# Patient Record
Sex: FEMALE | Race: ASIAN | NOT HISPANIC OR LATINO
[De-identification: names, ages, dates, MRNs, and addresses within clinical notes are randomized per-mention and may not be internally consistent; named-entity substitution may affect disease eponyms.]

---

## 2017-03-06 ENCOUNTER — APPOINTMENT (OUTPATIENT)
Dept: MAMMOGRAPHY | Facility: CLINIC | Age: 47
End: 2017-03-06

## 2017-03-06 ENCOUNTER — OUTPATIENT (OUTPATIENT)
Dept: OUTPATIENT SERVICES | Facility: HOSPITAL | Age: 47
LOS: 1 days | End: 2017-03-06
Payer: COMMERCIAL

## 2017-03-06 ENCOUNTER — APPOINTMENT (OUTPATIENT)
Dept: ULTRASOUND IMAGING | Facility: CLINIC | Age: 47
End: 2017-03-06

## 2017-03-06 DIAGNOSIS — Z00.8 ENCOUNTER FOR OTHER GENERAL EXAMINATION: ICD-10-CM

## 2017-04-13 PROCEDURE — 76641 ULTRASOUND BREAST COMPLETE: CPT

## 2017-04-13 PROCEDURE — 77063 BREAST TOMOSYNTHESIS BI: CPT

## 2017-04-13 PROCEDURE — 77067 SCR MAMMO BI INCL CAD: CPT

## 2018-04-13 ENCOUNTER — RESULT REVIEW (OUTPATIENT)
Age: 48
End: 2018-04-13

## 2018-04-30 ENCOUNTER — OUTPATIENT (OUTPATIENT)
Dept: OUTPATIENT SERVICES | Facility: HOSPITAL | Age: 48
LOS: 1 days | End: 2018-04-30
Payer: COMMERCIAL

## 2018-04-30 ENCOUNTER — APPOINTMENT (OUTPATIENT)
Dept: MAMMOGRAPHY | Facility: CLINIC | Age: 48
End: 2018-04-30
Payer: COMMERCIAL

## 2018-04-30 ENCOUNTER — APPOINTMENT (OUTPATIENT)
Dept: ULTRASOUND IMAGING | Facility: CLINIC | Age: 48
End: 2018-04-30
Payer: COMMERCIAL

## 2018-04-30 DIAGNOSIS — Z12.31 ENCOUNTER FOR SCREENING MAMMOGRAM FOR MALIGNANT NEOPLASM OF BREAST: ICD-10-CM

## 2018-04-30 PROCEDURE — 77063 BREAST TOMOSYNTHESIS BI: CPT | Mod: 26

## 2018-04-30 PROCEDURE — 77067 SCR MAMMO BI INCL CAD: CPT | Mod: 26

## 2018-04-30 PROCEDURE — 76641 ULTRASOUND BREAST COMPLETE: CPT | Mod: 26,50

## 2018-04-30 PROCEDURE — 76641 ULTRASOUND BREAST COMPLETE: CPT

## 2018-04-30 PROCEDURE — 77063 BREAST TOMOSYNTHESIS BI: CPT

## 2018-04-30 PROCEDURE — 77067 SCR MAMMO BI INCL CAD: CPT

## 2019-04-16 ENCOUNTER — RESULT REVIEW (OUTPATIENT)
Age: 49
End: 2019-04-16

## 2019-06-27 ENCOUNTER — APPOINTMENT (OUTPATIENT)
Dept: MAMMOGRAPHY | Facility: CLINIC | Age: 49
End: 2019-06-27
Payer: COMMERCIAL

## 2019-06-27 ENCOUNTER — APPOINTMENT (OUTPATIENT)
Dept: ULTRASOUND IMAGING | Facility: CLINIC | Age: 49
End: 2019-06-27
Payer: COMMERCIAL

## 2019-06-27 ENCOUNTER — OUTPATIENT (OUTPATIENT)
Dept: OUTPATIENT SERVICES | Facility: HOSPITAL | Age: 49
LOS: 1 days | End: 2019-06-27
Payer: COMMERCIAL

## 2019-06-27 DIAGNOSIS — R92.8 OTHER ABNORMAL AND INCONCLUSIVE FINDINGS ON DIAGNOSTIC IMAGING OF BREAST: ICD-10-CM

## 2019-06-27 PROCEDURE — 77067 SCR MAMMO BI INCL CAD: CPT | Mod: 26

## 2019-06-27 PROCEDURE — 77067 SCR MAMMO BI INCL CAD: CPT

## 2019-06-27 PROCEDURE — 76641 ULTRASOUND BREAST COMPLETE: CPT | Mod: 26,50

## 2019-06-27 PROCEDURE — 77063 BREAST TOMOSYNTHESIS BI: CPT

## 2019-06-27 PROCEDURE — 76641 ULTRASOUND BREAST COMPLETE: CPT

## 2019-06-27 PROCEDURE — 77063 BREAST TOMOSYNTHESIS BI: CPT | Mod: 26

## 2020-07-13 ENCOUNTER — RESULT REVIEW (OUTPATIENT)
Age: 50
End: 2020-07-13

## 2020-07-15 ENCOUNTER — APPOINTMENT (OUTPATIENT)
Dept: ULTRASOUND IMAGING | Facility: CLINIC | Age: 50
End: 2020-07-15
Payer: COMMERCIAL

## 2020-07-15 ENCOUNTER — APPOINTMENT (OUTPATIENT)
Dept: MAMMOGRAPHY | Facility: CLINIC | Age: 50
End: 2020-07-15
Payer: COMMERCIAL

## 2020-07-15 ENCOUNTER — OUTPATIENT (OUTPATIENT)
Dept: OUTPATIENT SERVICES | Facility: HOSPITAL | Age: 50
LOS: 1 days | End: 2020-07-15
Payer: COMMERCIAL

## 2020-07-15 DIAGNOSIS — Z00.8 ENCOUNTER FOR OTHER GENERAL EXAMINATION: ICD-10-CM

## 2020-07-15 DIAGNOSIS — R92.8 OTHER ABNORMAL AND INCONCLUSIVE FINDINGS ON DIAGNOSTIC IMAGING OF BREAST: ICD-10-CM

## 2020-07-15 PROCEDURE — 77063 BREAST TOMOSYNTHESIS BI: CPT

## 2020-07-15 PROCEDURE — 76641 ULTRASOUND BREAST COMPLETE: CPT | Mod: 26,50

## 2020-07-15 PROCEDURE — 77067 SCR MAMMO BI INCL CAD: CPT

## 2020-07-15 PROCEDURE — 77063 BREAST TOMOSYNTHESIS BI: CPT | Mod: 26

## 2020-07-15 PROCEDURE — 76641 ULTRASOUND BREAST COMPLETE: CPT

## 2020-07-15 PROCEDURE — 77067 SCR MAMMO BI INCL CAD: CPT | Mod: 26

## 2021-01-25 ENCOUNTER — RESULT REVIEW (OUTPATIENT)
Age: 51
End: 2021-01-25

## 2021-02-10 ENCOUNTER — EMERGENCY (EMERGENCY)
Facility: HOSPITAL | Age: 51
LOS: 1 days | Discharge: HOME | End: 2021-02-10
Payer: COMMERCIAL

## 2021-02-10 VITALS
SYSTOLIC BLOOD PRESSURE: 129 MMHG | DIASTOLIC BLOOD PRESSURE: 84 MMHG | HEIGHT: 59 IN | WEIGHT: 108.03 LBS | HEART RATE: 88 BPM | RESPIRATION RATE: 20 BRPM | OXYGEN SATURATION: 98 % | TEMPERATURE: 98 F

## 2021-02-10 LAB — SARS-COV-2 RNA SPEC QL NAA+PROBE: SIGNIFICANT CHANGE UP

## 2021-02-10 PROCEDURE — U0005: CPT

## 2021-02-10 PROCEDURE — 99282 EMERGENCY DEPT VISIT SF MDM: CPT

## 2021-02-10 PROCEDURE — U0003: CPT

## 2021-02-10 PROCEDURE — 99283 EMERGENCY DEPT VISIT LOW MDM: CPT

## 2021-02-10 NOTE — ED PROVIDER NOTE - PATIENT PORTAL LINK FT
You can access the FollowMyHealth Patient Portal offered by [facility] by registering at the following website: [email or web address]. By joining [facility]’s FollowMyHealth portal, you will also be able to view your health information using other applications (apps) compatible with our system.

## 2021-02-10 NOTE — ED PROVIDER NOTE - NSFOLLOWUPINSTRUCTIONS_ED_ALL_ED_FT
Patient was given preprinted [facility] instructions for URI and Covid information.    Verbal instructions were given to patient and answered all questions. patient verbalizes understanding

## 2021-02-10 NOTE — ED PROVIDER NOTE - OBJECTIVE STATEMENT
COVID ASYMPTOMATIC SWAB  Pt presenting to the ER for COVID-19 testing. Denies fevers chills, loss of taste or smell, URI symptoms, chest pain or shortness of breath, nausea vomiting diarrhea abdominal pain, weakness or fatigue. Eating and drinking normal diet. Normal output. Pt requesting testing at this time. needs for travel

## 2021-07-06 PROBLEM — Z78.9 OTHER SPECIFIED HEALTH STATUS: Chronic | Status: ACTIVE | Noted: 2021-02-10

## 2021-07-19 ENCOUNTER — APPOINTMENT (OUTPATIENT)
Dept: MAMMOGRAPHY | Facility: CLINIC | Age: 51
End: 2021-07-19
Payer: COMMERCIAL

## 2021-07-19 ENCOUNTER — OUTPATIENT (OUTPATIENT)
Dept: OUTPATIENT SERVICES | Facility: HOSPITAL | Age: 51
LOS: 1 days | End: 2021-07-19
Payer: COMMERCIAL

## 2021-07-19 ENCOUNTER — APPOINTMENT (OUTPATIENT)
Dept: ULTRASOUND IMAGING | Facility: CLINIC | Age: 51
End: 2021-07-19
Payer: COMMERCIAL

## 2021-07-19 DIAGNOSIS — Z12.39 ENCOUNTER FOR OTHER SCREENING FOR MALIGNANT NEOPLASM OF BREAST: ICD-10-CM

## 2021-07-19 PROCEDURE — 77063 BREAST TOMOSYNTHESIS BI: CPT | Mod: 26

## 2021-07-19 PROCEDURE — 77067 SCR MAMMO BI INCL CAD: CPT | Mod: 26

## 2021-07-19 PROCEDURE — 77067 SCR MAMMO BI INCL CAD: CPT

## 2021-07-19 PROCEDURE — 77063 BREAST TOMOSYNTHESIS BI: CPT

## 2021-07-19 PROCEDURE — 76641 ULTRASOUND BREAST COMPLETE: CPT | Mod: 26,50

## 2021-07-19 PROCEDURE — 76641 ULTRASOUND BREAST COMPLETE: CPT

## 2021-07-30 ENCOUNTER — RESULT REVIEW (OUTPATIENT)
Age: 51
End: 2021-07-30

## 2021-09-29 ENCOUNTER — APPOINTMENT (OUTPATIENT)
Dept: INTERNAL MEDICINE | Facility: CLINIC | Age: 51
End: 2021-09-29
Payer: COMMERCIAL

## 2021-09-29 ENCOUNTER — NON-APPOINTMENT (OUTPATIENT)
Age: 51
End: 2021-09-29

## 2021-09-29 VITALS
BODY MASS INDEX: 20.96 KG/M2 | HEART RATE: 61 BPM | WEIGHT: 104 LBS | SYSTOLIC BLOOD PRESSURE: 108 MMHG | TEMPERATURE: 97.4 F | RESPIRATION RATE: 14 BRPM | HEIGHT: 59 IN | DIASTOLIC BLOOD PRESSURE: 70 MMHG | OXYGEN SATURATION: 98 %

## 2021-09-29 DIAGNOSIS — Z78.9 OTHER SPECIFIED HEALTH STATUS: ICD-10-CM

## 2021-09-29 DIAGNOSIS — Z80.3 FAMILY HISTORY OF MALIGNANT NEOPLASM OF BREAST: ICD-10-CM

## 2021-09-29 PROCEDURE — 90686 IIV4 VACC NO PRSV 0.5 ML IM: CPT

## 2021-09-29 PROCEDURE — 99386 PREV VISIT NEW AGE 40-64: CPT | Mod: 25

## 2021-09-29 PROCEDURE — 93000 ELECTROCARDIOGRAM COMPLETE: CPT

## 2021-09-29 PROCEDURE — G0008: CPT

## 2021-09-29 NOTE — HISTORY OF PRESENT ILLNESS
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 \par \par HARRIET is a 51 year F whom is here today for an annual well check and to establish care w/ a new PMD\par Today, pt reports feeling well and is w/o complaints. \par She denies any changes since our last f/u visit\par \par Specialists Involved:\par -OBGYN: Kathleen Reid ([phone])\par \par -Vaccines: Needs Shingles, TDap\par -Mammogram: 7/2021\par -Pap Smear: 6/2021\par -Colonoscopy: was provided GI Referral from her Gyn\par -FH of Colon, breast or ovarian CA: Mom had Breast CA in early 40's. \par \par -Hypothyroid: Remains on Levothyroxine 75mcg. No reported changes.

## 2021-09-29 NOTE — ASSESSMENT
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 \par \par HARRIET is a 51 year F whom is here today for an annual well check and to establish care w/ a new PMD\par \par Specialists Involved:\par -OBGYN: Kathleen Reid ([phone])\par \par EKG obtained in office today demonstrates NSR. normal axis/Intervals. Good-R-wave progression. Normal EKG. \par \par -F/u w/ GI (Colonoscopy)\par -RTO 6mo for TSH & CBC or sooner if needed\par

## 2021-09-29 NOTE — HEALTH RISK ASSESSMENT
[Very Good] : ~his/her~  mood as very good [Reviewed no changes] : Reviewed, no changes [Patient reported mammogram was normal] : Patient reported mammogram was normal [Patient reported PAP Smear was normal] : Patient reported PAP Smear was normal [Yes] : Yes [2 - 4 times a month (2 pts)] : 2-4 times a month (2 points) [1 or 2 (0 pts)] : 1 or 2 (0 points) [Never (0 pts)] : Never (0 points) [No] : In the past 12 months have you used drugs other than those required for medical reasons? No [0] : 2) Feeling down, depressed, or hopeless: Not at all (0) [PHQ-2 Negative - No further assessment needed] : PHQ-2 Negative - No further assessment needed [None] : None [] :  [# Of Children ___] : has [unfilled] children [Fully functional (bathing, dressing, toileting, transferring, walking, feeding)] : Fully functional (bathing, dressing, toileting, transferring, walking, feeding) [Fully functional (using the telephone, shopping, preparing meals, housekeeping, doing laundry, using] : Fully functional and needs no help or supervision to perform IADLs (using the telephone, shopping, preparing meals, housekeeping, doing laundry, using transportation, managing medications and managing finances) [] : No [Audit-CScore] : 2 [[id number]] : 0 [Change in mental status noted] : No change in mental status noted [Reports changes in hearing] : Reports no changes in hearing [Reports changes in vision] : Reports no changes in vision [MammogramDate] : 07/21 [PapSmearDate] : 06/21 [AdvancecareDate] : 09/21

## 2022-03-30 ENCOUNTER — APPOINTMENT (OUTPATIENT)
Dept: INTERNAL MEDICINE | Facility: CLINIC | Age: 52
End: 2022-03-30
Payer: COMMERCIAL

## 2022-03-30 VITALS
RESPIRATION RATE: 16 BRPM | DIASTOLIC BLOOD PRESSURE: 76 MMHG | SYSTOLIC BLOOD PRESSURE: 110 MMHG | HEIGHT: 59 IN | OXYGEN SATURATION: 98 % | HEART RATE: 56 BPM | BODY MASS INDEX: 21.57 KG/M2 | TEMPERATURE: 97.4 F | WEIGHT: 107 LBS

## 2022-03-30 PROCEDURE — 36415 COLL VENOUS BLD VENIPUNCTURE: CPT

## 2022-03-30 PROCEDURE — 99213 OFFICE O/P EST LOW 20 MIN: CPT | Mod: 25

## 2022-03-30 NOTE — ASSESSMENT
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 \par \par HARRIET is a 51 year F whom is here today for f/u Hypothyroidism \par \par Specialists Involved:\par -OBGYN: Kathleen Reid ([phone])\par \par -F/u labs drawn in office today\par -Further recs pending lab results\par -Still needs to consult w/ GI (Colonoscopy)\par -RTO 6-12mo for CPE or sooner if needed\par

## 2022-03-30 NOTE — HISTORY OF PRESENT ILLNESS
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 \par \par HARRIET is a 51 year F whom is here today for f/u Hypothyroidism \par Today, pt reports feeling well and is w/o complaints. \par She denies any changes since our last f/u visit\par \par -Hypothyroid: Remains on Levothyroxine 75mcg. No reported changes.

## 2022-03-31 LAB — TSH SERPL-ACNC: 1.17 UIU/ML

## 2022-08-05 ENCOUNTER — RESULT REVIEW (OUTPATIENT)
Age: 52
End: 2022-08-05

## 2022-08-15 ENCOUNTER — OUTPATIENT (OUTPATIENT)
Dept: OUTPATIENT SERVICES | Facility: HOSPITAL | Age: 52
LOS: 1 days | End: 2022-08-15
Payer: COMMERCIAL

## 2022-08-15 ENCOUNTER — APPOINTMENT (OUTPATIENT)
Dept: MAMMOGRAPHY | Facility: CLINIC | Age: 52
End: 2022-08-15

## 2022-08-15 ENCOUNTER — APPOINTMENT (OUTPATIENT)
Dept: ULTRASOUND IMAGING | Facility: CLINIC | Age: 52
End: 2022-08-15

## 2022-08-15 DIAGNOSIS — Z00.8 ENCOUNTER FOR OTHER GENERAL EXAMINATION: ICD-10-CM

## 2022-08-15 PROCEDURE — 76641 ULTRASOUND BREAST COMPLETE: CPT

## 2022-08-15 PROCEDURE — 77067 SCR MAMMO BI INCL CAD: CPT

## 2022-08-15 PROCEDURE — 77063 BREAST TOMOSYNTHESIS BI: CPT

## 2022-08-15 PROCEDURE — 76641 ULTRASOUND BREAST COMPLETE: CPT | Mod: 26,50

## 2022-08-15 PROCEDURE — 77063 BREAST TOMOSYNTHESIS BI: CPT | Mod: 26

## 2022-08-15 PROCEDURE — 77067 SCR MAMMO BI INCL CAD: CPT | Mod: 26

## 2022-08-19 ENCOUNTER — APPOINTMENT (OUTPATIENT)
Dept: ULTRASOUND IMAGING | Facility: CLINIC | Age: 52
End: 2022-08-19

## 2022-08-19 ENCOUNTER — APPOINTMENT (OUTPATIENT)
Dept: MAMMOGRAPHY | Facility: CLINIC | Age: 52
End: 2022-08-19

## 2022-08-19 ENCOUNTER — OUTPATIENT (OUTPATIENT)
Dept: OUTPATIENT SERVICES | Facility: HOSPITAL | Age: 52
LOS: 1 days | End: 2022-08-19
Payer: COMMERCIAL

## 2022-08-19 DIAGNOSIS — Z00.8 ENCOUNTER FOR OTHER GENERAL EXAMINATION: ICD-10-CM

## 2022-08-19 PROCEDURE — G0279: CPT

## 2022-08-19 PROCEDURE — G0279: CPT | Mod: 26

## 2022-08-19 PROCEDURE — 76642 ULTRASOUND BREAST LIMITED: CPT

## 2022-08-19 PROCEDURE — 77065 DX MAMMO INCL CAD UNI: CPT | Mod: 26,LT

## 2022-08-19 PROCEDURE — 77065 DX MAMMO INCL CAD UNI: CPT

## 2022-08-19 PROCEDURE — 76642 ULTRASOUND BREAST LIMITED: CPT | Mod: 26,LT

## 2023-04-11 ENCOUNTER — APPOINTMENT (OUTPATIENT)
Dept: INTERNAL MEDICINE | Facility: CLINIC | Age: 53
End: 2023-04-11
Payer: COMMERCIAL

## 2023-04-11 VITALS
OXYGEN SATURATION: 98 % | HEART RATE: 62 BPM | WEIGHT: 106 LBS | HEIGHT: 59 IN | SYSTOLIC BLOOD PRESSURE: 100 MMHG | TEMPERATURE: 97 F | RESPIRATION RATE: 16 BRPM | BODY MASS INDEX: 21.37 KG/M2 | DIASTOLIC BLOOD PRESSURE: 68 MMHG

## 2023-04-11 LAB
HCT VFR BLD CALC: 39.2 %
HGB BLD-MCNC: 12.7 G/DL
MCHC RBC-ENTMCNC: 32 PG
MCHC RBC-ENTMCNC: 32.4 GM/DL
MCV RBC AUTO: 98.7 FL
PLATELET # BLD AUTO: 279 K/UL
RBC # BLD: 3.97 M/UL
RBC # FLD: 13.2 %
TSH SERPL-ACNC: 0.96 UIU/ML
WBC # FLD AUTO: 3.43 K/UL

## 2023-04-11 PROCEDURE — 99214 OFFICE O/P EST MOD 30 MIN: CPT | Mod: 25

## 2023-04-11 PROCEDURE — 36415 COLL VENOUS BLD VENIPUNCTURE: CPT

## 2023-04-11 NOTE — HEALTH RISK ASSESSMENT
[No] : In the past 12 months have you used drugs other than those required for medical reasons? No [0] : 2) Feeling down, depressed, or hopeless: Not at all (0) [PHQ-2 Negative - No further assessment needed] : PHQ-2 Negative - No further assessment needed [Reviewed no changes] : Reviewed, no changes [[id number]] : 0 [AdvancecareDate] : 04/23

## 2023-04-11 NOTE — HISTORY OF PRESENT ILLNESS
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 \par \par HARRIET is a 52 year F whom is here today for f/u Hypothyroidism \par Today, pt reports feeling well and is w/o complaints. \par \par -Hypothyroid: Remains on Levothyroxine 75mcg. No reported changes.

## 2023-04-11 NOTE — ASSESSMENT
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 \par \par HARRIET is a 52 year F whom is here today for f/u Hypothyroidism \par \par Specialists Involved:\par -OBGYN: Kathleen Reid ([phone])\par \par -F/u labs drawn in office today\par -Further recs pending lab results\par -Still needs to consult w/ GI (Colonoscopy)\par -RTO 1yr for CPE or sooner if needed

## 2023-08-17 ENCOUNTER — APPOINTMENT (OUTPATIENT)
Dept: ULTRASOUND IMAGING | Facility: CLINIC | Age: 53
End: 2023-08-17
Payer: COMMERCIAL

## 2023-08-17 ENCOUNTER — OUTPATIENT (OUTPATIENT)
Dept: OUTPATIENT SERVICES | Facility: HOSPITAL | Age: 53
LOS: 1 days | End: 2023-08-17
Payer: COMMERCIAL

## 2023-08-17 ENCOUNTER — APPOINTMENT (OUTPATIENT)
Dept: MAMMOGRAPHY | Facility: CLINIC | Age: 53
End: 2023-08-17
Payer: COMMERCIAL

## 2023-08-17 DIAGNOSIS — Z00.8 ENCOUNTER FOR OTHER GENERAL EXAMINATION: ICD-10-CM

## 2023-08-17 DIAGNOSIS — R92.8 OTHER ABNORMAL AND INCONCLUSIVE FINDINGS ON DIAGNOSTIC IMAGING OF BREAST: ICD-10-CM

## 2023-08-17 PROCEDURE — 77063 BREAST TOMOSYNTHESIS BI: CPT | Mod: 26

## 2023-08-17 PROCEDURE — 77067 SCR MAMMO BI INCL CAD: CPT | Mod: 26

## 2023-08-17 PROCEDURE — 76641 ULTRASOUND BREAST COMPLETE: CPT

## 2023-08-17 PROCEDURE — 76641 ULTRASOUND BREAST COMPLETE: CPT | Mod: 26,50

## 2023-08-17 PROCEDURE — 77063 BREAST TOMOSYNTHESIS BI: CPT

## 2023-08-17 PROCEDURE — 77067 SCR MAMMO BI INCL CAD: CPT

## 2023-11-28 ENCOUNTER — RESULT CHARGE (OUTPATIENT)
Age: 53
End: 2023-11-28

## 2023-11-29 ENCOUNTER — NON-APPOINTMENT (OUTPATIENT)
Age: 53
End: 2023-11-29

## 2023-11-29 ENCOUNTER — APPOINTMENT (OUTPATIENT)
Dept: INTERNAL MEDICINE | Facility: CLINIC | Age: 53
End: 2023-11-29
Payer: COMMERCIAL

## 2023-11-29 VITALS
RESPIRATION RATE: 16 BRPM | OXYGEN SATURATION: 98 % | WEIGHT: 109 LBS | TEMPERATURE: 97.3 F | HEIGHT: 59 IN | HEART RATE: 61 BPM | DIASTOLIC BLOOD PRESSURE: 80 MMHG | BODY MASS INDEX: 21.97 KG/M2 | SYSTOLIC BLOOD PRESSURE: 110 MMHG

## 2023-11-29 DIAGNOSIS — E03.9 HYPOTHYROIDISM, UNSPECIFIED: ICD-10-CM

## 2023-11-29 DIAGNOSIS — Z00.00 ENCOUNTER FOR GENERAL ADULT MEDICAL EXAMINATION W/OUT ABNORMAL FINDINGS: ICD-10-CM

## 2023-11-29 DIAGNOSIS — Z13.1 ENCOUNTER FOR SCREENING FOR DIABETES MELLITUS: ICD-10-CM

## 2023-11-29 DIAGNOSIS — Z23 ENCOUNTER FOR IMMUNIZATION: ICD-10-CM

## 2023-11-29 DIAGNOSIS — D72.820 LYMPHOCYTOSIS (SYMPTOMATIC): ICD-10-CM

## 2023-11-29 DIAGNOSIS — Z13.6 ENCOUNTER FOR SCREENING FOR CARDIOVASCULAR DISORDERS: ICD-10-CM

## 2023-11-29 DIAGNOSIS — R42 DIZZINESS AND GIDDINESS: ICD-10-CM

## 2023-11-29 DIAGNOSIS — Z13.220 ENCOUNTER FOR SCREENING FOR LIPOID DISORDERS: ICD-10-CM

## 2023-11-29 PROCEDURE — 36415 COLL VENOUS BLD VENIPUNCTURE: CPT

## 2023-11-29 PROCEDURE — 99396 PREV VISIT EST AGE 40-64: CPT | Mod: 25

## 2023-11-29 PROCEDURE — 90686 IIV4 VACC NO PRSV 0.5 ML IM: CPT

## 2023-11-29 PROCEDURE — G0008: CPT

## 2023-11-29 PROCEDURE — 93000 ELECTROCARDIOGRAM COMPLETE: CPT

## 2023-11-29 RX ORDER — MECLIZINE HYDROCHLORIDE 12.5 MG/1
12.5 TABLET ORAL 3 TIMES DAILY
Qty: 30 | Refills: 0 | Status: ACTIVE | COMMUNITY
Start: 2023-11-29 | End: 1900-01-01

## 2023-11-30 LAB
ALBUMIN SERPL ELPH-MCNC: 4.7 G/DL
ALP BLD-CCNC: 48 U/L
ALT SERPL-CCNC: 13 U/L
ANION GAP SERPL CALC-SCNC: 12 MMOL/L
AST SERPL-CCNC: 22 U/L
BASOPHILS # BLD AUTO: 0.04 K/UL
BASOPHILS NFR BLD AUTO: 0.7 %
BILIRUB SERPL-MCNC: 0.4 MG/DL
BUN SERPL-MCNC: 23 MG/DL
CALCIUM SERPL-MCNC: 10 MG/DL
CHLORIDE SERPL-SCNC: 101 MMOL/L
CHOLEST SERPL-MCNC: 236 MG/DL
CO2 SERPL-SCNC: 26 MMOL/L
CREAT SERPL-MCNC: 0.87 MG/DL
EGFR: 80 ML/MIN/1.73M2
EOSINOPHIL # BLD AUTO: 0.17 K/UL
EOSINOPHIL NFR BLD AUTO: 3.1 %
ESTIMATED AVERAGE GLUCOSE: 91 MG/DL
GLUCOSE SERPL-MCNC: 88 MG/DL
HBA1C MFR BLD HPLC: 4.8 %
HCT VFR BLD CALC: 37.8 %
HDLC SERPL-MCNC: 108 MG/DL
HGB BLD-MCNC: 12.4 G/DL
IMM GRANULOCYTES NFR BLD AUTO: 0.2 %
LDLC SERPL CALC-MCNC: 115 MG/DL
LYMPHOCYTES # BLD AUTO: 2.36 K/UL
LYMPHOCYTES NFR BLD AUTO: 42.4 %
MAN DIFF?: NORMAL
MCHC RBC-ENTMCNC: 32 PG
MCHC RBC-ENTMCNC: 32.8 GM/DL
MCV RBC AUTO: 97.7 FL
MONOCYTES # BLD AUTO: 0.55 K/UL
MONOCYTES NFR BLD AUTO: 9.9 %
NEUTROPHILS # BLD AUTO: 2.43 K/UL
NEUTROPHILS NFR BLD AUTO: 43.7 %
NONHDLC SERPL-MCNC: 128 MG/DL
PLATELET # BLD AUTO: 270 K/UL
POTASSIUM SERPL-SCNC: 4.3 MMOL/L
PROT SERPL-MCNC: 7.5 G/DL
RBC # BLD: 3.87 M/UL
RBC # FLD: 13.3 %
SODIUM SERPL-SCNC: 138 MMOL/L
TRIGL SERPL-MCNC: 73 MG/DL
TSH SERPL-ACNC: 1 UIU/ML
WBC # FLD AUTO: 5.56 K/UL

## 2024-05-01 ENCOUNTER — RX RENEWAL (OUTPATIENT)
Age: 54
End: 2024-05-01

## 2024-05-01 RX ORDER — LEVOTHYROXINE SODIUM 0.07 MG/1
75 TABLET ORAL DAILY
Qty: 90 | Refills: 3 | Status: ACTIVE | COMMUNITY
Start: 2021-09-29 | End: 1900-01-01

## 2024-05-28 ENCOUNTER — APPOINTMENT (OUTPATIENT)
Dept: ORTHOPEDIC SURGERY | Facility: CLINIC | Age: 54
End: 2024-05-28

## 2024-06-06 DIAGNOSIS — M79.18 MYALGIA, OTHER SITE: ICD-10-CM

## 2024-08-26 ENCOUNTER — APPOINTMENT (OUTPATIENT)
Dept: MAMMOGRAPHY | Facility: CLINIC | Age: 54
End: 2024-08-26

## 2024-08-26 ENCOUNTER — APPOINTMENT (OUTPATIENT)
Dept: ULTRASOUND IMAGING | Facility: CLINIC | Age: 54
End: 2024-08-26

## 2024-08-26 ENCOUNTER — OUTPATIENT (OUTPATIENT)
Dept: OUTPATIENT SERVICES | Facility: HOSPITAL | Age: 54
LOS: 1 days | End: 2024-08-26
Payer: COMMERCIAL

## 2024-08-26 DIAGNOSIS — Z12.31 ENCOUNTER FOR SCREENING MAMMOGRAM FOR MALIGNANT NEOPLASM OF BREAST: ICD-10-CM

## 2024-08-26 PROCEDURE — 76641 ULTRASOUND BREAST COMPLETE: CPT

## 2024-08-26 PROCEDURE — 77063 BREAST TOMOSYNTHESIS BI: CPT

## 2024-08-26 PROCEDURE — 77067 SCR MAMMO BI INCL CAD: CPT

## 2024-08-26 PROCEDURE — 77067 SCR MAMMO BI INCL CAD: CPT | Mod: 26

## 2024-08-26 PROCEDURE — 77063 BREAST TOMOSYNTHESIS BI: CPT | Mod: 26

## 2024-08-26 PROCEDURE — 76641 ULTRASOUND BREAST COMPLETE: CPT | Mod: 26,50

## 2024-11-12 ENCOUNTER — APPOINTMENT (OUTPATIENT)
Dept: INTERNAL MEDICINE | Facility: CLINIC | Age: 54
End: 2024-11-12
Payer: COMMERCIAL

## 2024-11-12 VITALS
RESPIRATION RATE: 16 BRPM | OXYGEN SATURATION: 98 % | DIASTOLIC BLOOD PRESSURE: 90 MMHG | HEART RATE: 75 BPM | HEIGHT: 59 IN | TEMPERATURE: 97.8 F | SYSTOLIC BLOOD PRESSURE: 130 MMHG

## 2024-11-12 DIAGNOSIS — B02.39 OTHER HERPES ZOSTER EYE DISEASE: ICD-10-CM

## 2024-11-12 PROCEDURE — 99213 OFFICE O/P EST LOW 20 MIN: CPT

## 2024-11-12 RX ORDER — VALACYCLOVIR 1 G/1
1 TABLET, FILM COATED ORAL
Qty: 21 | Refills: 0 | Status: ACTIVE | COMMUNITY
Start: 2024-11-12 | End: 1900-01-01

## 2024-11-12 RX ORDER — METHYLPREDNISOLONE 4 MG/1
4 TABLET ORAL
Qty: 1 | Refills: 0 | Status: ACTIVE | COMMUNITY
Start: 2024-11-12 | End: 1900-01-01

## 2024-12-02 ENCOUNTER — NON-APPOINTMENT (OUTPATIENT)
Age: 54
End: 2024-12-02

## 2024-12-02 ENCOUNTER — APPOINTMENT (OUTPATIENT)
Dept: INTERNAL MEDICINE | Facility: CLINIC | Age: 54
End: 2024-12-02

## 2024-12-02 VITALS
OXYGEN SATURATION: 100 % | WEIGHT: 109 LBS | RESPIRATION RATE: 16 BRPM | SYSTOLIC BLOOD PRESSURE: 120 MMHG | DIASTOLIC BLOOD PRESSURE: 70 MMHG | HEART RATE: 70 BPM | BODY MASS INDEX: 21.97 KG/M2 | TEMPERATURE: 98 F | HEIGHT: 59 IN

## 2024-12-02 DIAGNOSIS — B02.39 OTHER HERPES ZOSTER EYE DISEASE: ICD-10-CM

## 2024-12-02 DIAGNOSIS — Z13.6 ENCOUNTER FOR SCREENING FOR CARDIOVASCULAR DISORDERS: ICD-10-CM

## 2024-12-02 DIAGNOSIS — R42 DIZZINESS AND GIDDINESS: ICD-10-CM

## 2024-12-02 DIAGNOSIS — Z13.220 ENCOUNTER FOR SCREENING FOR LIPOID DISORDERS: ICD-10-CM

## 2024-12-02 DIAGNOSIS — Z13.1 ENCOUNTER FOR SCREENING FOR DIABETES MELLITUS: ICD-10-CM

## 2024-12-02 DIAGNOSIS — Z23 ENCOUNTER FOR IMMUNIZATION: ICD-10-CM

## 2024-12-02 DIAGNOSIS — D72.820 LYMPHOCYTOSIS (SYMPTOMATIC): ICD-10-CM

## 2024-12-02 DIAGNOSIS — E03.9 HYPOTHYROIDISM, UNSPECIFIED: ICD-10-CM

## 2024-12-02 DIAGNOSIS — Z00.00 ENCOUNTER FOR GENERAL ADULT MEDICAL EXAMINATION W/OUT ABNORMAL FINDINGS: ICD-10-CM

## 2024-12-02 PROCEDURE — 93000 ELECTROCARDIOGRAM COMPLETE: CPT

## 2024-12-02 PROCEDURE — 99396 PREV VISIT EST AGE 40-64: CPT | Mod: 25

## 2024-12-02 PROCEDURE — G0008: CPT

## 2024-12-02 PROCEDURE — 90656 IIV3 VACC NO PRSV 0.5 ML IM: CPT

## 2024-12-02 PROCEDURE — 36415 COLL VENOUS BLD VENIPUNCTURE: CPT

## 2024-12-03 DIAGNOSIS — D75.89 OTHER SPECIFIED DISEASES OF BLOOD AND BLOOD-FORMING ORGANS: ICD-10-CM

## 2024-12-03 LAB
ALBUMIN SERPL ELPH-MCNC: 4.3 G/DL
ALP BLD-CCNC: 40 U/L
ALT SERPL-CCNC: 15 U/L
ANION GAP SERPL CALC-SCNC: 11 MMOL/L
AST SERPL-CCNC: 21 U/L
BILIRUB SERPL-MCNC: 0.4 MG/DL
BUN SERPL-MCNC: 28 MG/DL
CALCIUM SERPL-MCNC: 9.5 MG/DL
CHLORIDE SERPL-SCNC: 100 MMOL/L
CHOLEST SERPL-MCNC: 210 MG/DL
CO2 SERPL-SCNC: 25 MMOL/L
CREAT SERPL-MCNC: 0.84 MG/DL
EGFR: 83 ML/MIN/1.73M2
ESTIMATED AVERAGE GLUCOSE: 88 MG/DL
GLUCOSE SERPL-MCNC: 86 MG/DL
HBA1C MFR BLD HPLC: 4.7 %
HDLC SERPL-MCNC: 91 MG/DL
LDLC SERPL CALC-MCNC: 103 MG/DL
NONHDLC SERPL-MCNC: 119 MG/DL
POTASSIUM SERPL-SCNC: 4.5 MMOL/L
PROT SERPL-MCNC: 7.1 G/DL
SODIUM SERPL-SCNC: 136 MMOL/L
TRIGL SERPL-MCNC: 95 MG/DL
TSH SERPL-ACNC: 0.41 UIU/ML

## 2024-12-04 LAB
BASOPHILS # BLD AUTO: 0.03 K/UL
BASOPHILS NFR BLD AUTO: 0.6 %
EOSINOPHIL # BLD AUTO: 0.07 K/UL
EOSINOPHIL NFR BLD AUTO: 1.4 %
FOLATE SERPL-MCNC: 11 NG/ML
HCT VFR BLD CALC: 36.3 %
HGB BLD-MCNC: 12 G/DL
IMM GRANULOCYTES NFR BLD AUTO: 0.2 %
LYMPHOCYTES # BLD AUTO: 1.98 K/UL
LYMPHOCYTES NFR BLD AUTO: 38.2 %
MAN DIFF?: NORMAL
MCHC RBC-ENTMCNC: 33.1 G/DL
MCHC RBC-ENTMCNC: 33.6 PG
MCV RBC AUTO: 101.7 FL
MONOCYTES # BLD AUTO: 0.6 K/UL
MONOCYTES NFR BLD AUTO: 11.6 %
NEUTROPHILS # BLD AUTO: 2.49 K/UL
NEUTROPHILS NFR BLD AUTO: 48 %
PLATELET # BLD AUTO: 259 K/UL
RBC # BLD: 3.57 M/UL
RBC # FLD: 14.1 %
VIT B12 SERPL-MCNC: 777 PG/ML
WBC # FLD AUTO: 5.18 K/UL

## 2025-05-05 ENCOUNTER — RX RENEWAL (OUTPATIENT)
Age: 55
End: 2025-05-05